# Patient Record
Sex: FEMALE | Race: BLACK OR AFRICAN AMERICAN
[De-identification: names, ages, dates, MRNs, and addresses within clinical notes are randomized per-mention and may not be internally consistent; named-entity substitution may affect disease eponyms.]

---

## 2019-10-28 ENCOUNTER — HOSPITAL ENCOUNTER (EMERGENCY)
Dept: HOSPITAL 61 - ER | Age: 84
Discharge: HOME | End: 2019-10-28
Payer: MEDICARE

## 2019-10-28 VITALS — BODY MASS INDEX: 30.36 KG/M2 | HEIGHT: 62 IN | WEIGHT: 165 LBS

## 2019-10-28 VITALS — SYSTOLIC BLOOD PRESSURE: 166 MMHG | DIASTOLIC BLOOD PRESSURE: 82 MMHG

## 2019-10-28 DIAGNOSIS — I10: ICD-10-CM

## 2019-10-28 DIAGNOSIS — K80.20: Primary | ICD-10-CM

## 2019-10-28 DIAGNOSIS — Z90.49: ICD-10-CM

## 2019-10-28 DIAGNOSIS — N30.00: ICD-10-CM

## 2019-10-28 DIAGNOSIS — Z85.038: ICD-10-CM

## 2019-10-28 LAB
ALBUMIN SERPL-MCNC: 2.9 G/DL (ref 3.4–5)
ALBUMIN/GLOB SERPL: 0.6 {RATIO} (ref 1–1.7)
ALP SERPL-CCNC: 81 U/L (ref 46–116)
ALT SERPL-CCNC: 16 U/L (ref 14–59)
ANION GAP SERPL CALC-SCNC: 10 MMOL/L (ref 6–14)
APTT BLD: 26 SEC (ref 24–38)
APTT PPP: YELLOW S
AST SERPL-CCNC: 18 U/L (ref 15–37)
BACTERIA #/AREA URNS HPF: (no result) /HPF
BASOPHILS # BLD AUTO: 0 X10^3/UL (ref 0–0.2)
BASOPHILS NFR BLD: 1 % (ref 0–3)
BILIRUB SERPL-MCNC: 0.3 MG/DL (ref 0.2–1)
BILIRUB UR QL STRIP: NEGATIVE
BUN SERPL-MCNC: 15 MG/DL (ref 7–20)
BUN/CREAT SERPL: 15 (ref 6–20)
CALCIUM SERPL-MCNC: 8.6 MG/DL (ref 8.5–10.1)
CHLORIDE SERPL-SCNC: 108 MMOL/L (ref 98–107)
CK SERPL-CCNC: 94 U/L (ref 26–192)
CO2 SERPL-SCNC: 23 MMOL/L (ref 21–32)
CREAT SERPL-MCNC: 1 MG/DL (ref 0.6–1)
EOSINOPHIL NFR BLD: 0.1 X10^3/UL (ref 0–0.7)
EOSINOPHIL NFR BLD: 3 % (ref 0–3)
ERYTHROCYTE [DISTWIDTH] IN BLOOD BY AUTOMATED COUNT: 14.1 % (ref 11.5–14.5)
FIBRINOGEN PPP-MCNC: (no result) MG/DL
GFR SERPLBLD BASED ON 1.73 SQ M-ARVRAT: 63.5 ML/MIN
GLOBULIN SER-MCNC: 4.6 G/DL (ref 2.2–3.8)
GLUCOSE SERPL-MCNC: 101 MG/DL (ref 70–99)
HCT VFR BLD CALC: 37.1 % (ref 36–47)
HGB BLD-MCNC: 12.4 G/DL (ref 12–15.5)
HYALINE CASTS #/AREA URNS LPF: (no result) /HPF
LIPASE: 117 U/L (ref 73–393)
LYMPHOCYTES # BLD: 0.6 X10^3/UL (ref 1–4.8)
LYMPHOCYTES NFR BLD AUTO: 12 % (ref 24–48)
MAGNESIUM SERPL-MCNC: 1.8 MG/DL (ref 1.8–2.4)
MCH RBC QN AUTO: 31 PG (ref 25–35)
MCHC RBC AUTO-ENTMCNC: 33 G/DL (ref 31–37)
MCV RBC AUTO: 92 FL (ref 79–100)
MONO #: 0.5 X10^3/UL (ref 0–1.1)
MONOCYTES NFR BLD: 11 % (ref 0–9)
NEUT #: 3.5 X10^3/UL (ref 1.8–7.7)
NEUTROPHILS NFR BLD AUTO: 73 % (ref 31–73)
NITRITE UR QL STRIP: NEGATIVE
PH UR STRIP: 5.5 [PH]
PLATELET # BLD AUTO: 219 X10^3/UL (ref 140–400)
POTASSIUM SERPL-SCNC: 3.7 MMOL/L (ref 3.5–5.1)
PROT SERPL-MCNC: 7.5 G/DL (ref 6.4–8.2)
PROT UR STRIP-MCNC: 100 MG/DL
PROTHROMBIN TIME: 13.1 SEC (ref 11.7–14)
RBC # BLD AUTO: 4.05 X10^6/UL (ref 3.5–5.4)
RBC #/AREA URNS HPF: (no result) /HPF (ref 0–2)
SODIUM SERPL-SCNC: 141 MMOL/L (ref 136–145)
SQUAMOUS #/AREA URNS LPF: (no result) /LPF
UROBILINOGEN UR-MCNC: 0.2 MG/DL
WBC # BLD AUTO: 4.8 X10^3/UL (ref 4–11)
WBC #/AREA URNS HPF: (no result) /HPF (ref 0–4)

## 2019-10-28 PROCEDURE — 87086 URINE CULTURE/COLONY COUNT: CPT

## 2019-10-28 PROCEDURE — 85025 COMPLETE CBC W/AUTO DIFF WBC: CPT

## 2019-10-28 PROCEDURE — 93005 ELECTROCARDIOGRAM TRACING: CPT

## 2019-10-28 PROCEDURE — 85730 THROMBOPLASTIN TIME PARTIAL: CPT

## 2019-10-28 PROCEDURE — 80053 COMPREHEN METABOLIC PANEL: CPT

## 2019-10-28 PROCEDURE — 82553 CREATINE MB FRACTION: CPT

## 2019-10-28 PROCEDURE — 74176 CT ABD & PELVIS W/O CONTRAST: CPT

## 2019-10-28 PROCEDURE — 85610 PROTHROMBIN TIME: CPT

## 2019-10-28 PROCEDURE — 96374 THER/PROPH/DIAG INJ IV PUSH: CPT

## 2019-10-28 PROCEDURE — 83735 ASSAY OF MAGNESIUM: CPT

## 2019-10-28 PROCEDURE — 83605 ASSAY OF LACTIC ACID: CPT

## 2019-10-28 PROCEDURE — 84484 ASSAY OF TROPONIN QUANT: CPT

## 2019-10-28 PROCEDURE — 99285 EMERGENCY DEPT VISIT HI MDM: CPT

## 2019-10-28 PROCEDURE — 81001 URINALYSIS AUTO W/SCOPE: CPT

## 2019-10-28 PROCEDURE — 83690 ASSAY OF LIPASE: CPT

## 2019-10-28 PROCEDURE — 36415 COLL VENOUS BLD VENIPUNCTURE: CPT

## 2019-10-28 PROCEDURE — 96375 TX/PRO/DX INJ NEW DRUG ADDON: CPT

## 2019-10-28 NOTE — EKG
[facility]

              [address] [place] [address]

Test Date:    2019-10-28               Test Time:    00:56:41

Pat Name:     BETTYE HUBER              Department:   

Patient ID:   [id number]           Room:          

Gender:       F                        Technician:   

:          1932               Requested By: ZUHAIR LOU

Order Number: [id number]           Reading MD:   Hugh Bentley MD

                                 Measurements

Intervals                              Axis          

Rate:         66                       P:            

MA:                                    QRS:          -43

QRSD:         118                      T:            19

QT:           450                                    

QTc:          474                                    

                           Interpretive Statements

SR

RBBB

NON-SPECIFIC ST/T CHANGES

Electronically Signed On 2019 9:24:41 CST by Hugh Bentley MD

## 2019-10-28 NOTE — PHYS DOC
Past Medical History


Past Medical History:  Cancer, Hypertension


Additional Past Medical Histor:  COLON CA


Past Surgical History:  Colectomy


Additional Past Surgical Histo:  INTESTIONAL MESH


Additional Information:  


Nonsmoker


Alcohol Use:  None


Drug Use:  None





Adult General


Chief Complaint


Chief Complaint:  ABDOMINAL PAIN





HPI


HPI





86 y/o female presents with history of intermittent epigastric pain and nausea x

2 days.  Reports has had similar episodes in the past which typically "go away".

Denies fever/chills.  Denies trauma.





Review of Systems


Review of Systems





Constitutional: Denies fever or chills 


Eyes: Denies redness or eye pain 


HENT: Denies nasal congestion or sore throat


Respiratory: Denies cough or shortness of breath 


Cardiovascular: Denies chest pain or palpitations


GI: Reports abdominal pain and nausea; denies vomiting


: Denies dysuria or hematuria


Musculoskeletal: Denies back pain or joint pain


Integument: Denies rash or skin lesions 


Neurologic: Denies headache, focal weakness or sensory changes





Complete systems were reviewed and found to be within normal limits, except as 

documented in this note.





Current Medications


Current Medications





Current Medications








 Medications


  (Trade)  Dose


 Ordered  Sig/Cecilio  Start Time


 Stop Time Status Last Admin


Dose Admin


 


 Ceftriaxone Sodium


  (Rocephin)  1 gm  1X  ONCE  10/28/19 03:00


 10/28/19 03:01 DC 10/28/19 02:43


1 GM


 


 Famotidine


  (Pepcid Vial)  20 mg  1X  ONCE  10/28/19 01:00


 10/28/19 01:01 DC 10/28/19 00:48


20 MG


 


 Ondansetron HCl


  (Zofran)  4 mg  1X  ONCE  10/28/19 01:00


 10/28/19 01:01 DC 10/28/19 00:48


4 MG


 


 Sodium Chloride  1,000 ml @ 


 1,000 mls/hr  1X  ONCE  10/28/19 01:00


 10/28/19 01:59 DC 10/28/19 00:48


1,000 MLS/HR











Allergies


Allergies





Allergies








Coded Allergies Type Severity Reaction Last Updated Verified


 


  iodine Allergy Intermediate Itching 12/16/13 Yes











Physical Exam


Physical Exam





Constitutional: Well developed, well nourished, no acute distress, non-toxic 

appearance


HENT: Normocephalic, atraumatic, oropharynx moist


Eyes: Conjunctiva normal, no discharge


Neck: Normal range of motion, no tenderness, supple


Cardiovascular: Heart rate normal, regular rhythm


Lungs & Thorax:  Bilateral breath sounds clear to auscultation, no wheezing


Abdomen: Soft, mild epigastric tenderness


Skin: Warm, dry, no erythema, no rash


Back: No tenderness, no CVA tenderness


Extremities: No tenderness, ROM intact, no edema


Neurologic: Alert and oriented X 3, normal motor function, normal sensory 

function, no focal deficits noted


Psychologic: Affect normal, judgement normal, mood normal





Current Patient Data


Vital Signs





                                   Vital Signs








  Date Time  Temp Pulse Resp B/P (MAP) Pulse Ox O2 Delivery O2 Flow Rate FiO2


 


10/28/19 02:44  68 16 166/82 (110) 95 Room Air  


 


10/28/19 00:14 97.7       





 97.7       








Lab Values





                                Laboratory Tests








Test


 10/28/19


00:05 10/28/19


00:17 10/28/19


01:35


 


White Blood Count


 4.8 x10^3/uL


(4.0-11.0) 


 





 


Red Blood Count


 4.05 x10^6/uL


(3.50-5.40) 


 





 


Hemoglobin


 12.4 g/dL


(12.0-15.5) 


 





 


Hematocrit


 37.1 %


(36.0-47.0) 


 





 


Mean Corpuscular Volume


 92 fL ()


 


 





 


Mean Corpuscular Hemoglobin 31 pg (25-35)    


 


Mean Corpuscular Hemoglobin


Concent 33 g/dL


(31-37) 


 





 


Red Cell Distribution Width


 14.1 %


(11.5-14.5) 


 





 


Platelet Count


 219 x10^3/uL


(140-400) 


 





 


Neutrophils (%) (Auto) 73 % (31-73)    


 


Lymphocytes (%) (Auto) 12 % (24-48)  L  


 


Monocytes (%) (Auto) 11 % (0-9)  H  


 


Eosinophils (%) (Auto) 3 % (0-3)    


 


Basophils (%) (Auto) 1 % (0-3)    


 


Neutrophils # (Auto)


 3.5 x10^3/uL


(1.8-7.7) 


 





 


Lymphocytes # (Auto)


 0.6 x10^3/uL


(1.0-4.8)  L 


 





 


Monocytes # (Auto)


 0.5 x10^3/uL


(0.0-1.1) 


 





 


Eosinophils # (Auto)


 0.1 x10^3/uL


(0.0-0.7) 


 





 


Basophils # (Auto)


 0.0 x10^3/uL


(0.0-0.2) 


 





 


Prothrombin Time


 13.1 SEC


(11.7-14.0) 


 





 


Prothrombin Time INR 1.0 (0.8-1.1)    


 


Activated Partial


Thromboplast Time 26 SEC (24-38)


 


 





 


Lactic Acid Level


 1.2 mmol/L


(0.4-2.0) 


 





 


Urine Collection Type  Unknown   


 


Urine Color  Yellow   


 


Urine Clarity  Cloudy   


 


Urine pH  5.5   


 


Urine Specific Gravity  1.020   


 


Urine Protein


 


 100 mg/dL


(NEG-TRACE) 





 


Urine Glucose (UA)


 


 Negative mg/dL


(NEG) 





 


Urine Ketones (Stick)


 


 Negative mg/dL


(NEG) 





 


Urine Blood  Large (NEG)   


 


Urine Nitrite


 


 Negative (NEG)


 





 


Urine Bilirubin


 


 Negative (NEG)


 





 


Urine Urobilinogen Dipstick


 


 0.2 mg/dL (0.2


mg/dL) 





 


Urine Leukocyte Esterase  Large (NEG)   


 


Urine RBC


 


 Occ /HPF (0-2)


 





 


Urine WBC


 


 Tntc /HPF


(0-4) 





 


Urine Squamous Epithelial


Cells 


 Many /LPF  


 





 


Urine Bacteria


 


 Many /HPF


(0-FEW) 





 


Urine Hyaline Casts  Few /HPF   


 


Urine Mucus  Mod /LPF   


 


Sodium Level


 


 


 141 mmol/L


(136-145)


 


Potassium Level


 


 


 3.7 mmol/L


(3.5-5.1)


 


Chloride Level


 


 


 108 mmol/L


()  H


 


Carbon Dioxide Level


 


 


 23 mmol/L


(21-32)


 


Anion Gap   10 (6-14)  


 


Blood Urea Nitrogen


 


 


 15 mg/dL


(7-20)


 


Creatinine


 


 


 1.0 mg/dL


(0.6-1.0)


 


Estimated GFR


(Cockcroft-Gault) 


 


 63.5  





 


BUN/Creatinine Ratio   15 (6-20)  


 


Glucose Level


 


 


 101 mg/dL


(70-99)  H


 


Calcium Level


 


 


 8.6 mg/dL


(8.5-10.1)


 


Magnesium Level


 


 


 1.8 mg/dL


(1.8-2.4)


 


Total Bilirubin


 


 


 0.3 mg/dL


(0.2-1.0)


 


Aspartate Amino Transferase


(AST) 


 


 18 U/L (15-37)





 


Alanine Aminotransferase (ALT)


 


 


 16 U/L (14-59)





 


Alkaline Phosphatase


 


 


 81 U/L


()


 


Creatine Kinase


 


 


 94 U/L


()


 


Creatine Kinase MB (Mass)


 


 


 1.0 ng/mL


(0.0-3.6)


 


Creatine Kinase MB Relative


Index 


 


 1.1 % (0-4)  





 


Troponin I Quantitative


 


 


 < 0.017 ng/mL


(0.000-0.055)


 


Total Protein


 


 


 7.5 g/dL


(6.4-8.2)


 


Albumin


 


 


 2.9 g/dL


(3.4-5.0)  L


 


Albumin/Globulin Ratio


 


 


 0.6 (1.0-1.7)


L


 


Lipase


 


 


 117 U/L


()





                                Laboratory Tests


10/28/19 00:05








                                Laboratory Tests


10/28/19 01:35














EKG


EKG


@0056 NSR at 66bpm with arrhythmia, NO ST elevation, baseline artifact





Radiology/Procedures


Radiology/Procedures


PROCEDURE: CT ABDOMEN PELVIS WO CONTRAST





 


Abdominal and Pelvis CT, Without Contrast:


 


History: Epigastric pain nausea and vomiting


 


Comparison: None.


 


Procedure: Axial images are obtained of the abdomen and pelvis, without IV


or oral contrast.


 


Oral Contrast:  No


 


Findings:


 


Evaluation of solid organs is limited without contrast. Evaluation of 


stomach and bowel is limited without oral contrast.


 


There are stones the gallbladder without wall thickening or surrounding 


inflammation.


 


There is an ileostomy on the right


 


 


 


Liver: Normal.


 


Spleen: Normal.


 


Pancreas: Normal.


 


 


Adrenal Glands: Normal.


 


Kidneys: Normal.


 


There is no free air or free fluid.


 


There is no lymphadenopathy.


 


 


The urinary bladder is collapsed.


 


 


There is no pericolonic inflammation identified.


 


Impression:


 


Cholelithiasis without evidence of acute cholecystitis.


 


No acute findings.


 


End impression


 


PQRS Compliance Statement:


 


One or more of the following individualized dose reduction techniques were


utilized for this examination:  


1. Automated exposure control  


2. Adjustment of the mA and/or kV according to patient size  


3. Use of iterative reconstruction technique


 


Electronically signed by: Mehran Wood III, MD (10/28/2019 1:28 AM) 


[facility]





Course & Med Decision Making


Course & Med Decision Making


Pertinent Labs and Imaging studies reviewed. (See chart for details)





Patient presents with report of epigastric pain which has been worse over the 

last 2 days but of which patient has had similar episodes over the last 2 years.

 Symptomatic treatment provided.  Labs obtained and posted to chart. UA with 

signs of infection.  Empiric antibiotics given.  EKG stable.  CT abd/pelvis with

cholelithiasis noted.  Patient stable for discharge with outpatient follow-up 

with PCP/GI.  GI referral provided. Discussed findings and plan with patient and

family, who acknowledge understanding and agreement.





Dragon Disclaimer


Dragon Disclaimer


This electronic medical record was generated, in whole or in part, using a voice

recognition dictation system.





Departure


Departure


Impression:  


   Primary Impression:  


   Epigastric abdominal pain


   Additional Impressions:  


   Cholelithiasis


   UTI (urinary tract infection)


Disposition:  01 HOME, SELF-CARE


Condition:  STABLE


Referrals:  


CAYETANO ARVIZU (PCP)








CLARISSA AGUDELO MD


Patient Instructions:  Abdominal Pain, Easy-to-Read, Cholelithiasis, Ea

sy-to-Read, Urinary Tract Infection, Easy-to-Read


Scripts


Ondansetron (ONDANSETRON ODT) 4 Mg Tab.rapdis


1 TAB PO PRN Q6-8HRS PRN for NAUSEA, #16 TAB


   Prov: ZUHAIR LOU DO         10/28/19 


Cephalexin (KEFLEX) 500 Mg Capsule


500 MG PO TID for 7 Days, #21 CAP


   Prov: ZUHAIR LOU DO         10/28/19





Problem Qualifiers








   Additional Impressions:  


   Cholelithiasis


   Cholelithiasis location:  gallbladder  Cholecystitis presence:  without 

   cholecystitis  Biliary obstruction:  without biliary obstruction  Qualified 

   Codes:  K80.20 - Calculus of gallbladder without cholecystitis without 

   obstruction


   UTI (urinary tract infection)


   Urinary tract infection type:  acute cystitis  Hematuria presence:  without 

   hematuria  Qualified Codes:  N30.00 - Acute cystitis without hematuria








ZUHAIR LOU DO             Oct 28, 2019 00:56

## 2019-10-28 NOTE — RAD
Abdominal and Pelvis CT, Without Contrast:

 

History: Epigastric pain nausea and vomiting

 

Comparison: None.

 

Procedure: Axial images are obtained of the abdomen and pelvis, without IV

or oral contrast.

 

Oral Contrast:  No

 

Findings:

 

Evaluation of solid organs is limited without contrast. Evaluation of 

stomach and bowel is limited without oral contrast.

 

There are stones the gallbladder without wall thickening or surrounding 

inflammation.

 

There is an ileostomy on the right

 

 

 

Liver: Normal.

 

Spleen: Normal.

 

Pancreas: Normal.

 

 

Adrenal Glands: Normal.

 

Kidneys: Normal.

 

There is no free air or free fluid.

 

There is no lymphadenopathy.

 

 

The urinary bladder is collapsed.

 

 

There is no pericolonic inflammation identified.

 

Impression:

 

Cholelithiasis without evidence of acute cholecystitis.

 

No acute findings.

 

End impression

 

PQRS Compliance Statement:

 

One or more of the following individualized dose reduction techniques were

utilized for this examination:  

1. Automated exposure control  

2. Adjustment of the mA and/or kV according to patient size  

3. Use of iterative reconstruction technique

 

Electronically signed by: Mehran Wood III, MD (10/28/2019 1:28 AM) 

[facility]

## 2020-02-11 ENCOUNTER — HOSPITAL ENCOUNTER (EMERGENCY)
Dept: HOSPITAL 61 - ER | Age: 85
Discharge: HOME | End: 2020-02-11
Payer: MEDICARE

## 2020-02-11 VITALS — SYSTOLIC BLOOD PRESSURE: 132 MMHG | DIASTOLIC BLOOD PRESSURE: 68 MMHG

## 2020-02-11 VITALS — HEIGHT: 62 IN | WEIGHT: 165.35 LBS | BODY MASS INDEX: 30.43 KG/M2

## 2020-02-11 DIAGNOSIS — I10: Primary | ICD-10-CM

## 2020-02-11 DIAGNOSIS — Z88.8: ICD-10-CM

## 2020-02-11 DIAGNOSIS — Z93.3: ICD-10-CM

## 2020-02-11 DIAGNOSIS — Z85.038: ICD-10-CM

## 2020-02-11 LAB
ALBUMIN SERPL-MCNC: 3.4 G/DL (ref 3.4–5)
ALBUMIN/GLOB SERPL: 0.8 {RATIO} (ref 1–1.7)
ALP SERPL-CCNC: 93 U/L (ref 46–116)
ALT SERPL-CCNC: 19 U/L (ref 14–59)
ANION GAP SERPL CALC-SCNC: 7 MMOL/L (ref 6–14)
AST SERPL-CCNC: 19 U/L (ref 15–37)
BASOPHILS # BLD AUTO: 0 X10^3/UL (ref 0–0.2)
BASOPHILS NFR BLD: 1 % (ref 0–3)
BILIRUB SERPL-MCNC: 0.3 MG/DL (ref 0.2–1)
BUN SERPL-MCNC: 16 MG/DL (ref 7–20)
BUN/CREAT SERPL: 13 (ref 6–20)
CALCIUM SERPL-MCNC: 9.2 MG/DL (ref 8.5–10.1)
CHLORIDE SERPL-SCNC: 106 MMOL/L (ref 98–107)
CO2 SERPL-SCNC: 28 MMOL/L (ref 21–32)
CREAT SERPL-MCNC: 1.2 MG/DL (ref 0.6–1)
EOSINOPHIL NFR BLD: 0.1 X10^3/UL (ref 0–0.7)
EOSINOPHIL NFR BLD: 2 % (ref 0–3)
ERYTHROCYTE [DISTWIDTH] IN BLOOD BY AUTOMATED COUNT: 14.1 % (ref 11.5–14.5)
GFR SERPLBLD BASED ON 1.73 SQ M-ARVRAT: 51.3 ML/MIN
GLOBULIN SER-MCNC: 4.4 G/DL (ref 2.2–3.8)
GLUCOSE SERPL-MCNC: 114 MG/DL (ref 70–99)
HCT VFR BLD CALC: 37.6 % (ref 36–47)
HGB BLD-MCNC: 12.1 G/DL (ref 12–15.5)
LYMPHOCYTES # BLD: 0.6 X10^3/UL (ref 1–4.8)
LYMPHOCYTES NFR BLD AUTO: 12 % (ref 24–48)
MCH RBC QN AUTO: 31 PG (ref 25–35)
MCHC RBC AUTO-ENTMCNC: 32 G/DL (ref 31–37)
MCV RBC AUTO: 95 FL (ref 79–100)
MONO #: 0.6 X10^3/UL (ref 0–1.1)
MONOCYTES NFR BLD: 12 % (ref 0–9)
NEUT #: 3.5 X10^3/UL (ref 1.8–7.7)
NEUTROPHILS NFR BLD AUTO: 74 % (ref 31–73)
PLATELET # BLD AUTO: 215 X10^3/UL (ref 140–400)
POTASSIUM SERPL-SCNC: 3.4 MMOL/L (ref 3.5–5.1)
PROT SERPL-MCNC: 7.8 G/DL (ref 6.4–8.2)
RBC # BLD AUTO: 3.96 X10^6/UL (ref 3.5–5.4)
SODIUM SERPL-SCNC: 141 MMOL/L (ref 136–145)
WBC # BLD AUTO: 4.8 X10^3/UL (ref 4–11)

## 2020-02-11 PROCEDURE — 93005 ELECTROCARDIOGRAM TRACING: CPT

## 2020-02-11 PROCEDURE — 85025 COMPLETE CBC W/AUTO DIFF WBC: CPT

## 2020-02-11 PROCEDURE — 83880 ASSAY OF NATRIURETIC PEPTIDE: CPT

## 2020-02-11 PROCEDURE — 84484 ASSAY OF TROPONIN QUANT: CPT

## 2020-02-11 PROCEDURE — 71045 X-RAY EXAM CHEST 1 VIEW: CPT

## 2020-02-11 PROCEDURE — 70450 CT HEAD/BRAIN W/O DYE: CPT

## 2020-02-11 PROCEDURE — 36415 COLL VENOUS BLD VENIPUNCTURE: CPT

## 2020-02-11 PROCEDURE — 80053 COMPREHEN METABOLIC PANEL: CPT

## 2020-02-11 NOTE — RAD
Exam: CT head

 

INDICATION: Headache

 

TECHNIQUE: Sequential axial images through the head were obtained without 

the administration of IV contrast.

 

Comparisons: None

 

FINDINGS:

No focal parenchymal lesion or hemorrhage is identified. There is no 

midline shift or sulcal effacement.

 

No acute vascular territory infarction is identified. Gray-white 

distinction is preserved.

 

The ventricular system is within normal limits without compression 

hydrocephalus. The basal cisterns are well maintained.

 

The visualized portions of the paranasal sinuses and mastoid air cells are

well-pneumatized. No acute fractures.

 

IMPRESSION:

No acute intracranial abnormality.

 

Exposure: One or more of the following in the visualized dose reduction 

techniques were utilized for this examination:

1.  Automated exposure control

2.  Adjustment of the MA and/or KV according to patient size

Use of iterative of reconstructive technique

 

Electronically signed by: Jose Elias Lam MD (2/11/2020 10:01 PM) [id number]

## 2020-02-11 NOTE — PHYS DOC
Past Medical History


Past Medical History:  Hypertension


Additional Past Medical Histor:  COLON CA


Past Surgical History:  Cancer Surgery


Additional Past Surgical Histo:  INTESTIONAL MESH, COLOSTOMY,


Smoking Status:  Never Smoker


Alcohol Use:  None


Drug Use:  None





Adult General


Chief Complaint


Chief Complaint:  HYPERTENSION





HPI


HPI





Patient is a 88  year old Female who presents with a history of high blood 

pressure and states that recently her blood pressures been running higher than 

usual. She states that she takes her lisinopril 2.5 mg but her blood pressure 

began running high. She states she went to [place] to have teeth pulled they 

would not pull her teeth because her blood pressure was high. She states that 

her doctor told her to take a second lisinopril. She took that at 1600 today. 

Patient states that times her left cheek will "feel funny". She also states that

she does have a headache right at the top of her head that states that she is 

very mild and rates her pain an 8 out of 10. Patient denies chest pain, nausea, 

vomiting, dizziness, visual changes, numbness or tingling, shortness of breath, 

abdominal pain, weakness, fever, dysuria.





Review of Systems


Review of Systems








Neurologic:  headache, denies focal weakness or sensory changes []








All other systems were reviewed and found to be within normal limits, except as 

documented in this note.





Allergies


Allergies





Allergies








Coded Allergies Type Severity Reaction Last Updated Verified


 


  iodine Allergy Intermediate Itching 13 Yes











Physical Exam


Physical Exam





Constitutional: Well developed, well nourished, no acute distress, non-toxic 

appearance. []


HENT: Normocephalic, atraumatic, bilateral external ears normal, oropharynx 

moist, no oral exudates, nose normal. []


Eyes: PERRLA, EOMI, conjunctiva normal, no discharge. [] 


Neck: Normal range of motion, no tenderness, supple, no stridor. [] 


Cardiovascular:Heart rate regular rhythm, no murmur []


Lungs & Thorax:  Bilateral breath sounds clear to auscultation []


Abdomen: Bowel sounds normal, soft, no tenderness, no masses, no pulsatile 

masses. [] 


Skin: Warm, dry, no erythema, no rash. [] 


Back: No tenderness, no CVA tenderness. [] 


Extremities: No tenderness, no cyanosis, no clubbing, ROM intact, no edema. [] 


Neurologic: Alert and oriented X 3, normal motor function, normal sensory 

function, no focal deficits noted. []


Psychologic: Affect normal, judgement normal, mood normal. 





**Normal Physical Exam[]





Current Patient Data


Vital Signs





                                   Vital Signs








  Date Time  Temp Pulse Resp B/P (MAP) Pulse Ox O2 Delivery O2 Flow Rate FiO2


 


20 19:43 97.9 67 20 187/95 (125)  Room Air  





 97.9       


 


20 18:36       96.0 








Lab Values





                                Laboratory Tests








Test


 20


21:22


 


White Blood Count


 4.8 x10^3/uL


(4.0-11.0)


 


Red Blood Count


 3.96 x10^6/uL


(3.50-5.40)


 


Hemoglobin


 12.1 g/dL


(12.0-15.5)


 


Hematocrit


 37.6 %


(36.0-47.0)


 


Mean Corpuscular Volume


 95 fL ()





 


Mean Corpuscular Hemoglobin 31 pg (25-35)  


 


Mean Corpuscular Hemoglobin


Concent 32 g/dL


(31-37)


 


Red Cell Distribution Width


 14.1 %


(11.5-14.5)


 


Platelet Count


 215 x10^3/uL


(140-400)


 


Neutrophils (%) (Auto) 74 % (31-73)  H


 


Lymphocytes (%) (Auto) 12 % (24-48)  L


 


Monocytes (%) (Auto) 12 % (0-9)  H


 


Eosinophils (%) (Auto) 2 % (0-3)  


 


Basophils (%) (Auto) 1 % (0-3)  


 


Neutrophils # (Auto)


 3.5 x10^3/uL


(1.8-7.7)


 


Lymphocytes # (Auto)


 0.6 x10^3/uL


(1.0-4.8)  L


 


Monocytes # (Auto)


 0.6 x10^3/uL


(0.0-1.1)


 


Eosinophils # (Auto)


 0.1 x10^3/uL


(0.0-0.7)


 


Basophils # (Auto)


 0.0 x10^3/uL


(0.0-0.2)


 


Sodium Level


 141 mmol/L


(136-145)


 


Potassium Level


 3.4 mmol/L


(3.5-5.1)  L


 


Chloride Level


 106 mmol/L


()


 


Carbon Dioxide Level


 28 mmol/L


(21-32)


 


Anion Gap 7 (6-14)  


 


Blood Urea Nitrogen


 16 mg/dL


(7-20)


 


Creatinine


 1.2 mg/dL


(0.6-1.0)  H


 


Estimated GFR


(Cockcroft-Gault) 51.3  





 


BUN/Creatinine Ratio 13 (6-20)  


 


Glucose Level


 114 mg/dL


(70-99)  H


 


Calcium Level


 9.2 mg/dL


(8.5-10.1)


 


Total Bilirubin


 0.3 mg/dL


(0.2-1.0)


 


Aspartate Amino Transferase


(AST) 19 U/L (15-37)





 


Alanine Aminotransferase (ALT)


 19 U/L (14-59)





 


Alkaline Phosphatase


 93 U/L


()


 


Troponin I Quantitative


 < 0.017 ng/mL


(0.000-0.055)


 


NT-Pro-B-Type Natriuretic


Peptide 596 pg/mL


(0-449)  H


 


Total Protein


 7.8 g/dL


(6.4-8.2)


 


Albumin


 3.4 g/dL


(3.4-5.0)


 


Albumin/Globulin Ratio


 0.8 (1.0-1.7)


L





                                Laboratory Tests


20 21:22








                                Laboratory Tests


20 21:22











EKG


EKG


Sinus rhythm and no STEMI[]


Interpretation Time:


 and read by Dr. Mcbride





Radiology/Procedures


Radiology/Procedures


[]


Impressions:


[address] [address] [phone]


                                        


                                 IMAGING REPORT





                                     Signed





PATIENT: BETTYE HUBER    ACCOUNT: [id number]     MRN#: [id number]


: 1932           LOCATION: ER              AGE: 88


SEX: F                    EXAM DT: 20         ACCESSION#: [id number]


STATUS: REG ER            ORD. PHYSICIAN: DAVIS MADRID


REASON: hypertension


PROCEDURE: PORTABLE CHEST 1V





AP chest.


 


HISTORY: Hypertension


 


AP portable view was taken of the chest. Heart is normal in size. The 


aorta is tortuous. There are no confluent infiltrates. 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 


in both shoulders.


 


IMPRESSION:


1. No acute chest disease.


 


Electronically signed by: Frank Jacob MD (2020 8:40 PM) [id number]














DICTATED and SIGNED BY:     FRANK JACOB MD


DATE:     20





                            [address] [place] [address]


                                 [phone]


                                        


                                 IMAGING REPORT





                                     Signed





PATIENT: BETTYE HUBER    ACCOUNT: [id number]     MRN#: [id number]


: 1932           LOCATION: ER              AGE: 88


SEX: F                    EXAM DT: 20         ACCESSION#: [id number]


STATUS: REG ER            ORD. PHYSICIAN: DAVIS MADRID


REASON: HEADACHE. hx colon ca


PROCEDURE: CT HEAD WO CONTRAST





Exam: CT head


 


INDICATION: Headache


 


TECHNIQUE: Sequential axial images through the head were obtained without 


the administration of IV contrast.


 


Comparisons: None


 


FINDINGS:


No focal parenchymal lesion or hemorrhage is identified. There is no 


midline shift or sulcal effacement.


 


No acute vascular territory infarction is identified. Gray-white 


distinction is preserved.


 


The ventricular system is within normal limits without compression 


hydrocephalus. The basal cisterns are well maintained.


 


The visualized portions of the paranasal sinuses and mastoid air cells are


well-pneumatized. No acute fractures.


 


IMPRESSION:


No acute intracranial abnormality.


 


Exposure: One or more of the following in the visualized dose reduction 


techniques were utilized for this examination:


1.  Automated exposure control


2.  Adjustment of the MA and/or KV according to patient size


Use of iterative of reconstructive technique


 


Electronically signed by: Jose Elias Olivas MD (2020 10:01 PM) [id number]














DICTATED and SIGNED BY:     JOSE ELIAS OLIVAS MD


DATE:     20








Course & Med Decision Making


Course & Med Decision Making


Pertinent Labs and Imaging studies reviewed. (See chart for details)





PERRLA. Alert and oriented. Speaks in full clear sentences. Ambulatory with a 

steady gait. Skin pink warm and dry. Patient's blood pressures are in the 180s 

over 80s. No extremity swelling. Lungs are clear to auscultation all lobes. 

Chest x-ray shows no acute findings. EKG shows sinus rhythm and no STEMI.





: Patient's blood pressure has come down to 135/86 without medication. 

Patient states that her headache is completely gone and she feels fine.





Patient discharged home and to call her doctor in the morning for follow-up and 

changes in her blood pressure medications.





[]





Dragon Disclaimer


Dragon Disclaimer


This electronic medical record was generated, in whole or in part, using a voice

 recognition dictation system.





NIHSS Stroke Scale


NIH Stroke Scale:  








NIH Stroke Scale Response (Comments) Value


 


Level of Consciousness:                 0 Alert/Responsive 0


 


LOC Questions:                          0 Answers both correctly 0


 


LOC Commands:                           0 Performs both tasks 0


 


Best Gaze:                              0 Normal 0


 


Visual:                                 0 No visual loss 0


 


Facial Palsy:                           0 Normal, symmetrical 0


 


Motor - Left Arm                        0 No drift 0


 


Motor - Right Arm                       0 No drift 0


 


Motor - Left Leg                        0 No drift 0


 


Motor: Right Leg                        0 No drift 0


 


Limb Ataxia:                            0 Absent 0


 


Sensory:                                0 No loss 0


 


Best Language:                          0 Normal 0


 


Dysathria:                              0 Normal 0


 


Extinction and Inattention:             0 Normal 0


 


Total  0











Departure


Departure


Impression:  


   Primary Impression:  


   Hypertension


Disposition:  01 HOME, SELF-CARE


Condition:  STABLE


Referrals:  


IMER FALCON D.O. (PCP)


Patient Instructions:  Hypertension





Additional Instructions:  


Call your doctor in the morning for a follow-up appointment.





Problem Qualifiers








   Primary Impression:  


   Hypertension


   Hypertension type:  essential hypertension  Qualified Codes:  I10 - Essential

    (primary) hypertension








DAVIS MADRID            2020 21:31

## 2020-02-11 NOTE — RAD
AP chest.

 

HISTORY: Hypertension

 

AP portable view was taken of the chest. Heart is normal in size. The 

aorta is tortuous. There are no confluent infiltrates. 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 

in both shoulders.

 

IMPRESSION:

1. No acute chest disease.

 

Electronically signed by: Frank Jacob MD (2/11/2020 8:40 PM) [id number]

## 2020-02-12 NOTE — EKG
[facility]

              [address] [place] [address]

Test Date:    2020               Test Time:    20:57:59

Pat Name:     BETTYE HUBER              Department:   

Patient ID:   [id number]           Room:          

Gender:       F                        Technician:   

:          [id number]               Requested By: DAVIS MADRID

Order Number: [id number]           Reading MD:     

                                 Measurements

Intervals                              Axis          

Rate:         67                       P:            1

IN:           158                      QRS:          -62

QRSD:         120                      T:            2

QT:           424                                    

QTc:          451                                    

                           Interpretive Statements

SINUS RHYTHM

ABNORMAL LEFT AXIS DEVIATION

R-S TRANSITION ZONE IN V LEADS DISPLACED TO THE RIGHT

ABNORMAL ECG

RI6.01

No previous ECG available for comparison

## 2020-05-16 ENCOUNTER — HOSPITAL ENCOUNTER (EMERGENCY)
Dept: HOSPITAL 61 - ER | Age: 85
Discharge: HOME | End: 2020-05-16
Payer: MEDICARE

## 2020-05-16 VITALS
SYSTOLIC BLOOD PRESSURE: 157 MMHG | DIASTOLIC BLOOD PRESSURE: 93 MMHG | DIASTOLIC BLOOD PRESSURE: 93 MMHG | SYSTOLIC BLOOD PRESSURE: 157 MMHG

## 2020-05-16 VITALS — BODY MASS INDEX: 34.19 KG/M2 | HEIGHT: 60 IN | WEIGHT: 174.17 LBS

## 2020-05-16 DIAGNOSIS — Z88.8: ICD-10-CM

## 2020-05-16 DIAGNOSIS — I10: ICD-10-CM

## 2020-05-16 DIAGNOSIS — F32.9: ICD-10-CM

## 2020-05-16 DIAGNOSIS — E86.0: ICD-10-CM

## 2020-05-16 DIAGNOSIS — G24.9: Primary | ICD-10-CM

## 2020-05-16 DIAGNOSIS — F41.9: ICD-10-CM

## 2020-05-16 DIAGNOSIS — G24.9: ICD-10-CM

## 2020-05-16 DIAGNOSIS — R41.82: Primary | ICD-10-CM

## 2020-05-16 LAB
% BANDS: 1 % (ref 0–9)
% LYMPHS: 4 % (ref 24–48)
% MONOS: 1 % (ref 0–10)
% SEGS: 94 % (ref 35–66)
ALBUMIN SERPL-MCNC: 2.8 G/DL (ref 3.4–5)
ALBUMIN/GLOB SERPL: 0.7 {RATIO} (ref 1–1.7)
ALP SERPL-CCNC: 82 U/L (ref 46–116)
ALT SERPL-CCNC: 30 U/L (ref 14–59)
ANION GAP SERPL CALC-SCNC: 12 MMOL/L (ref 6–14)
APTT PPP: YELLOW S
AST SERPL-CCNC: 31 U/L (ref 15–37)
BACTERIA #/AREA URNS HPF: 0 /HPF
BASOPHILS # BLD AUTO: 0 X10^3/UL (ref 0–0.2)
BASOPHILS NFR BLD: 0 % (ref 0–3)
BILIRUB SERPL-MCNC: 0.3 MG/DL (ref 0.2–1)
BILIRUB UR QL STRIP: NEGATIVE
BUN SERPL-MCNC: 22 MG/DL (ref 7–20)
BUN/CREAT SERPL: 22 (ref 6–20)
CALCIUM SERPL-MCNC: 8.5 MG/DL (ref 8.5–10.1)
CHLORIDE SERPL-SCNC: 111 MMOL/L (ref 98–107)
CK SERPL-CCNC: 105 U/L (ref 26–192)
CO2 SERPL-SCNC: 23 MMOL/L (ref 21–32)
CREAT SERPL-MCNC: 1 MG/DL (ref 0.6–1)
EOSINOPHIL NFR BLD: 0 % (ref 0–3)
EOSINOPHIL NFR BLD: 0 X10^3/UL (ref 0–0.7)
ERYTHROCYTE [DISTWIDTH] IN BLOOD BY AUTOMATED COUNT: 14.4 % (ref 11.5–14.5)
FIBRINOGEN PPP-MCNC: CLEAR MG/DL
GFR SERPLBLD BASED ON 1.73 SQ M-ARVRAT: 63.3 ML/MIN
GLOBULIN SER-MCNC: 4.3 G/DL (ref 2.2–3.8)
GLUCOSE SERPL-MCNC: 104 MG/DL (ref 70–99)
HCT VFR BLD CALC: 39.8 % (ref 36–47)
HGB BLD-MCNC: 13.1 G/DL (ref 12–15.5)
HYALINE CASTS #/AREA URNS LPF: (no result) /HPF
LYMPHOCYTES # BLD: 0.4 X10^3/UL (ref 1–4.8)
LYMPHOCYTES NFR BLD AUTO: 4 % (ref 24–48)
MAGNESIUM SERPL-MCNC: 1.8 MG/DL (ref 1.8–2.4)
MCH RBC QN AUTO: 30 PG (ref 25–35)
MCHC RBC AUTO-ENTMCNC: 33 G/DL (ref 31–37)
MCV RBC AUTO: 92 FL (ref 79–100)
MONO #: 0.6 X10^3/UL (ref 0–1.1)
MONOCYTES NFR BLD: 6 % (ref 0–9)
NEUT #: 9.8 X10^3/UL (ref 1.8–7.7)
NEUTROPHILS NFR BLD AUTO: 90 % (ref 31–73)
NITRITE UR QL STRIP: NEGATIVE
PH UR STRIP: 6 [PH]
PLATELET # BLD AUTO: 161 X10^3/UL (ref 140–400)
PLATELET # BLD EST: ADEQUATE 10*3/UL
POTASSIUM SERPL-SCNC: 3.1 MMOL/L (ref 3.5–5.1)
PROT SERPL-MCNC: 7.1 G/DL (ref 6.4–8.2)
PROT UR STRIP-MCNC: 30 MG/DL
RBC # BLD AUTO: 4.31 X10^6/UL (ref 3.5–5.4)
RBC #/AREA URNS HPF: (no result) /HPF (ref 0–2)
SODIUM SERPL-SCNC: 146 MMOL/L (ref 136–145)
SQUAMOUS #/AREA URNS LPF: (no result) /LPF
UROBILINOGEN UR-MCNC: 0.2 MG/DL
WBC # BLD AUTO: 10.9 X10^3/UL (ref 4–11)
WBC #/AREA URNS HPF: 0 /HPF (ref 0–4)

## 2020-05-16 PROCEDURE — 84484 ASSAY OF TROPONIN QUANT: CPT

## 2020-05-16 PROCEDURE — 85025 COMPLETE CBC W/AUTO DIFF WBC: CPT

## 2020-05-16 PROCEDURE — 82550 ASSAY OF CK (CPK): CPT

## 2020-05-16 PROCEDURE — 99283 EMERGENCY DEPT VISIT LOW MDM: CPT

## 2020-05-16 PROCEDURE — 85007 BL SMEAR W/DIFF WBC COUNT: CPT

## 2020-05-16 PROCEDURE — 93005 ELECTROCARDIOGRAM TRACING: CPT

## 2020-05-16 PROCEDURE — 81001 URINALYSIS AUTO W/SCOPE: CPT

## 2020-05-16 PROCEDURE — 96361 HYDRATE IV INFUSION ADD-ON: CPT

## 2020-05-16 PROCEDURE — 80053 COMPREHEN METABOLIC PANEL: CPT

## 2020-05-16 PROCEDURE — 83735 ASSAY OF MAGNESIUM: CPT

## 2020-05-16 PROCEDURE — 83880 ASSAY OF NATRIURETIC PEPTIDE: CPT

## 2020-05-16 PROCEDURE — 96374 THER/PROPH/DIAG INJ IV PUSH: CPT

## 2020-05-16 PROCEDURE — 36415 COLL VENOUS BLD VENIPUNCTURE: CPT

## 2020-05-16 PROCEDURE — 71045 X-RAY EXAM CHEST 1 VIEW: CPT

## 2020-05-16 PROCEDURE — 99285 EMERGENCY DEPT VISIT HI MDM: CPT

## 2020-05-16 NOTE — RAD
AP chest.

 

HISTORY: Short of air

 

AP view was taken of the chest. Patient rotated to the right. There is 

arthritis in the shoulders especially on the right. The aorta is normal in

size. There is a pacemaker on the left with atrial and ventricular pacing 

leads without change. There is mild hazy atelectasis or infiltrate at the 

right costophrenic angle. No other infiltrates are noted.

 

IMPRESSION:

1. Mild hazy atelectasis or infiltrate at the right costophrenic angle.

2. No other acute infiltrates.

 

Electronically signed by: Frank Jacob MD (5/16/2020 12:36 PM) [id number]

## 2020-05-16 NOTE — PHYS DOC
Past Medical History


Past Medical History:  Anxiety, Depression, Hypertension


Additional Past Medical Histor:  COLON CA


Past Surgical History:  Cancer Surgery


Additional Past Surgical Histo:  INTESTIONAL MESH, COLOSTOMY, defib placed 2/20


Smoking Status:  Never Smoker


Alcohol Use:  None


Drug Use:  None





General Adult


EDM:


Chief Complaint:  ALTERED MENTAL STATUS





HPI:


HPI:





Patient is a 88  year old female who [facility] due to 

nursing staff refusing to accept patient, despite director of nursing having 

accepted patient back.  Patient's history and physical from previous stay has 

been reviewed.  Additional history is not available as patient is very poor 

historian and unable to answer questions.  []





Review of Systems:


Review of Systems:


Constitutional: No reported fever or chills. []


Respiratory: No reported cough or shortness of breath. [] 


Cardiovascular: No reported chest pain or edema. [] 


GI: No reported vomiting or diarrhea. [] 


Neurologic: Positive reported altered mental status. [] 





Unable to fully assess review of systems as patient is unable to answer 

questions.





Heart Score:


Risk Factors:


Risk Factors:  DM, Current or recent (<one month) smoker, HTN, HLP, family 

history of CAD, obesity.


Risk Scores:


Score 0 - 3:  2.5% MACE over next 6 weeks - Discharge Home


Score 4 - 6:  20.3% MACE over next 6 weeks - Admit for Clinical Observation


Score 7 - 10:  72.7% MACE over next 6 weeks - Early Invasive Strategies





Allergies:


Allergies:





Allergies








Coded Allergies Type Severity Reaction Last Updated Verified


 


  iodine Allergy Intermediate Itching 12/16/13 Yes











Physical Exam:


PE:





Constitutional: Well developed, well nourished, no acute distress, non-toxic 

appearance. []


HENT: Normocephalic, atraumatic, bilateral external ears normal, oropharynx 

moist, no oral exudates, nose normal. []


Eyes: PERRLA, EOMI, conjunctiva normal, no discharge. [] 


Neck: Normal range of motion, no tenderness, supple. [] 


Cardiovascular: Mildly tachycardic rate with regular rhythm []


Lungs & Thorax:  Bilateral breath sounds clear to auscultation []


Abdomen: Bowel sounds normal, soft, no tenderness. [] 


Skin: Warm, dry, no erythema, no rash. [] 


Extremities: No tenderness, no cyanosis, no clubbing, ROM intact, no edema. [] 


Neurologic: Awake and alert with tremor noted to left arm. []





EKG:


EKG:


[]





Radiology/Procedures:


Radiology/Procedures:


[]





Course & Med Decision Making:


Course & Med Decision Making


Pertinent Labs and Imaging studies reviewed. (See chart for details)





[]





Dragon Disclaimer:


Dragon Disclaimer:


This electronic medical record was generated, in whole or in part, using a voice

 recognition dictation system.





Departure


Departure


Impression:  


   Primary Impression:  


   AMS (altered mental status)


   Qualified Codes:  R41.82 - Altered mental status, unspecified


   Additional Impression:  


   Dystonia


Disposition:  01 HOME, SELF-CARE


Condition:  STABLE


Referrals:  


KRISTINE UGARTE MD (PCP)











SLAMA FORMAN Jr. DO          May 16, 2020 19:56

## 2020-05-16 NOTE — PDOC1
History and Physical


Date of Admission


Date of Admission


DATE: 5/16/20 


TIME: 16:03





Identification/Chief Complaint


Chief Complaint


Agitation





Source


Source:  Caregiver, Chart review, Patient





History of Present Illness


History of Present Illness


Ms Ceballos is an 87 yo F w/ PMHx Anxiety, Depression, Hypertension, colon ca s/p 

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 

was [facility] to [place] [facility] due to altered 

mental status while constantly yelling and shaking. She was confused throughout 

her hospital stay at [facility], seen by neurology and psychiatry, had negative MRI, 

was eventually placed on haldol with stabilization. Given her recently starting 

haldol she was given cogentin with improvement in ED. Of note nursing staff 

mentioned she was not in her left shoulder sling and both arms were restrained 

upon arrival. After cogentin was able to communicate she does not want to be 

tied down or locked up.





EKG WAS DONE AT 1042, HEART RATE OF 96 BPM, NO STEMI, PAC. CXR clear





She stopped  shaking,  became more awake alert, start talking with the nurses.  

Patient denies any headache, no chest pain, no cough or fever. Asked to leave 

the hospital.





Past Medical History


Cardiovascular:  HTN


Pulmonary:  No pertinent hx


CENTRAL NERVOUS SYSTEM:  Other


GI:  Constipation, GERD, Other


Heme/Onc:  Cancer


Hepatobiliary:  Cholelithiasis


Psych:  Anxiety


Musculoskeletal:  Osteoarthritis





Past Surgical History


Past Surgical History:  Colon Resection, Other





Social History


Smoke:  No


ALCOHOL:  none


Drugs:  None





Current Problem List


Problem List


Problems


Medical Problems:


(1) Anxiety


Status: Acute  





(2) Dehydration


Status: Acute  





(3) Dystonia


Status: Acute  











Current Medications


Current Medications





Current Medications


Benztropine Mesylate (Cogentin) 2 mg 1X  ONCE IV  Last administered on 5/16/20at

10:31;  Start 5/16/20 at 10:45;  Stop 5/16/20 at 10:46;  Status DC


Sodium Chloride 1,000 ml @  1,000 mls/hr 1X  ONCE IV  Last administered on 

5/16/20at 11:01;  Start 5/16/20 at 11:00;  Stop 5/16/20 at 11:59;  Status DC


Alprazolam (Xanax) 0.5 mg 1X  ONCE PO  Last administered on 5/16/20at 15:15;  

Start 5/16/20 at 15:15;  Stop 5/16/20 at 15:16;  Status DC





Active Scripts


Active


Benztropine Mesylate 1 Mg Tablet 1 Tab PO BID 3 Days


Reported


Norvasc (Amlodipine Besylate) 5 Mg Tablet 5 Mg PO DAILY


Metoprolol Tartrate 25 Mg Tablet 25 Mg PO BID


Lumigan (Bimatoprost) 2.5 Ml Drops 1 Drop EACHEYE QHS


Acetaminophen-Cod #3 Tablet (Acetaminophen/Codeine Phosphate) 1 Each Tablet 1 

Tab PO PRN QHS PRN


Clonidine Hcl 0.1 Mg Tablet 0.1 Mg PO PRN Q12HRS PRN


Losartan Potassium 50 Mg Tablet 50 Mg PO HS


Montelukast Sodium Tablet  ** (Montelukast Sodium) 10 Mg Tablet 10 Mg PO HS


Alprazolam 0.5 Mg Tablet 1-2 Tab PO BID


Aspirin 81 Mg Tab.chew 1 Tab PO DAILY


Acetaminophen-Cod #3 Tablet (Acetaminophen/Codeine Phosphate) 1 Each Tablet 1 

Tab PO DAILY


Cimetidine (Cimetidine Hcl) 300 Mg/5 Ml Solution 30 Mg PO DAILY


Zoloft (Sertraline Hcl) 50 Mg Tablet 1 Tab PO BID





Allergies


Allergies:  


Coded Allergies:  


     iodine (Verified  Allergy, Intermediate, Itching, 12/16/13)





ROS


General:  No: Chills, Night Sweats, Fatigue, Malaise, Appetite, Other


PSYCHOLOGICAL ROS:  YES: Depression; 


   No: Anxiety, Behavioral Disorder, Concentration difficultie, Decreased 

libido, Disorientation, Hallucinations, Hostility, Irritablity, Memory 

difficulties, Mood Swings, Obsessive thoughts, Physical abuse, Sexual abuse, 

Sleep disturbances, Suicidal ideation, Other


Eyes:  No Blurry vision, No Decreased vision, No Double vision, No Dry eyes, No 

Excessive tearing, No Eye Pain, No Itchy Eyes, No Loss of vision, No 

Photophobia, No Scotomata, No Uses contacts, No Uses glasses, No Other


HEENT:  No: Heacaches, Visual Changes, Hearing change, Nasal congestion, Nasal 

discharge, Oral lesions, Sinus pain, Sore Throat, Epistaxis, Sneezing, Snoring, 

Tinnitus, Vertigo, Vocal changes, Other


ALLERGY AND IMMUNOLOGY:  No: Hives, Insect Bite Sensitivity, Itchy/Watery Eyes, 

Nasal Congestion, Post Nasal Drip, Seasonal Allergies, Other


Hematological and Lymphatic:  No: Bleeding Problems, Blood Clots, Blood 

Transfusions, Brusing, Night Sweats, Pallor, Swollen Lymph Nodes, Other


ENDOCRINE:  No: Breast Changes, Galactorrhea, Hair Pattern Changes, Hot Flashes,

Malaise/lethargy, Mood Swings, Palpitations, Polydipsia/polyuria, Skin Changes, 

Temperature Intolerance, Unexpected Weight Changes, Other


Breast:  No New/Changing Breast Lumps, No Nipple changes, No Nipple discharge, 

No Other


Respiratory:  No: Cough, Hemoptysis, Orthopnea, Pleuritic Pain, Shortness of 

breath, SOB with excertion, Sputum Changes, Stridor, Tachypnea, Wheezing, Other


Cardiovascular:  No Chest Pain, No Palpitations, No Orthopnea, No Paroxysmal 

Noc. Dyspnea, No Edema, No Lt Headedness, No Other


Gastrointestinal:  No Nausea, No Vomiting, No Abdominal Pain, No Diarrhea, No 

Constipation, No Melena, No Hematochezia, No Other


Genitourinary:  No Dysuria, No Frequency, No Incontinence, No Hematuria, No 

Retention, No Discharge, No Urgency, No Pain, No Flank Pain, No Other, No , No ,

No , No , No , No , No 


Musculoskeletal:  No Gait Disturbance, No Joint Pain, No Joint Stiffness, No 

Joint Swelling, No Muscle Pain, No Muscular Weakness, No Pain In:, No Swelling 

In:, No Other


Neurological:  No Behavorial Changes, No Bowel/Bladder ControlChng, No 

Confusion, No Dizziness, No Gait Disturbance, No Headaches, No Impaired 

Coord/balance, No Memory Loss, No Numbness/Tingling, No Seizures, No Speech 

Problems, No Tremors, No Visual Changes, No Weakness, No Other


Skin:  No Dry Skin, No Eczema, No Hair Changes, No Lumps, No Mole Changes, No 

Mottling, No Nail Changes, No Pruritus, No Rash, No Skin Lesion Changes, No 

Other, No Acne





Physical Exam


General:  Alert, Cooperative, No acute distress


HEENT:  Atraumatic, PERRLA, EOMI, Mucous membr. moist/pink


Lungs:  Clear to auscultation, Normal air movement


Heart:  S1S2, RRR, no thrills, no rubs, other (left  chest with PPM site 

bandaged)


Abdomen:  Normal bowel sounds, Soft, No tenderness, No hepatosplenomegaly, No 

masses


Rectal Exam:  not examined


Extremities:  No clubbing, No cyanosis, No edema, Normal pulses, No 

tenderness/swelling


Skin:  No rashes, No breakdown, No significant lesion


Neuro:  Normal speech, Strength at 5/5 X4 ext, Normal tone, Sensation intact, 

Cranial nerves 3-12 NL, Reflexes 2+, Other (Gait not tested)





Vitals


Vitals





Vital Signs








  Date Time  Temp Pulse Resp B/P (MAP) Pulse Ox O2 Delivery O2 Flow Rate FiO2


 


5/16/20 15:07  108 20 208/107 (140) 100 Room Air  


 


5/16/20 10:05 97.5       





 97.5       











Labs


Labs





Laboratory Tests








Test


 5/16/20


10:20 5/16/20


12:39


 


White Blood Count


 10.9 x10^3/uL


(4.0-11.0) 





 


Red Blood Count


 4.31 x10^6/uL


(3.50-5.40) 





 


Hemoglobin


 13.1 g/dL


(12.0-15.5) 





 


Hematocrit


 39.8 %


(36.0-47.0) 





 


Mean Corpuscular Volume 92 fL ()  


 


Mean Corpuscular Hemoglobin 30 pg (25-35)  


 


Mean Corpuscular Hemoglobin


Concent 33 g/dL


(31-37) 





 


Red Cell Distribution Width


 14.4 %


(11.5-14.5) 





 


Platelet Count


 161 x10^3/uL


(140-400) 





 


Neutrophils (%) (Auto) 90 % (31-73)  


 


Lymphocytes (%) (Auto) 4 % (24-48)  


 


Monocytes (%) (Auto) 6 % (0-9)  


 


Eosinophils (%) (Auto) 0 % (0-3)  


 


Basophils (%) (Auto) 0 % (0-3)  


 


Neutrophils # (Auto)


 9.8 x10^3/uL


(1.8-7.7) 





 


Lymphocytes # (Auto)


 0.4 x10^3/uL


(1.0-4.8) 





 


Monocytes # (Auto)


 0.6 x10^3/uL


(0.0-1.1) 





 


Eosinophils # (Auto)


 0.0 x10^3/uL


(0.0-0.7) 





 


Basophils # (Auto)


 0.0 x10^3/uL


(0.0-0.2) 





 


Segmented Neutrophils % 94 % (35-66)  


 


Band Neutrophils % 1 % (0-9)  


 


Lymphocytes % 4 % (24-48)  


 


Monocytes % 1 % (0-10)  


 


Platelet Estimate


 Adequate


(ADEQUATE) 





 


Sodium Level


 146 mmol/L


(136-145) 





 


Potassium Level


 3.1 mmol/L


(3.5-5.1) 





 


Chloride Level


 111 mmol/L


() 





 


Carbon Dioxide Level


 23 mmol/L


(21-32) 





 


Anion Gap 12 (6-14)  


 


Blood Urea Nitrogen


 22 mg/dL


(7-20) 





 


Creatinine


 1.0 mg/dL


(0.6-1.0) 





 


Estimated GFR


(Cockcroft-Gault) 63.3 


 





 


BUN/Creatinine Ratio 22 (6-20)  


 


Glucose Level


 104 mg/dL


(70-99) 





 


Calcium Level


 8.5 mg/dL


(8.5-10.1) 





 


Magnesium Level


 1.8 mg/dL


(1.8-2.4) 





 


Total Bilirubin


 0.3 mg/dL


(0.2-1.0) 





 


Aspartate Amino Transf


(AST/SGOT) 31 U/L (15-37) 


 





 


Alanine Aminotransferase


(ALT/SGPT) 30 U/L (14-59) 


 





 


Alkaline Phosphatase


 82 U/L


() 





 


Creatine Kinase


 105 U/L


() 





 


Troponin I Quantitative


 0.174 ng/mL


(0.000-0.055) 





 


NT-Pro-B-Type Natriuretic


Peptide 1959 pg/mL


(0-449) 





 


Total Protein


 7.1 g/dL


(6.4-8.2) 





 


Albumin


 2.8 g/dL


(3.4-5.0) 





 


Albumin/Globulin Ratio 0.7 (1.0-1.7)  


 


Urine Collection Type  Unknown 


 


Urine Color  Yellow 


 


Urine Clarity  Clear 


 


Urine pH  6.0 (<5.0-8.0) 


 


Urine Specific Gravity


 


 1.025


(1.000-1.030)


 


Urine Protein


 


 30 mg/dL


(NEG-TRACE)


 


Urine Glucose (UA)


 


 Negative mg/dL


(NEG)


 


Urine Ketones (Stick)


 


 Negative mg/dL


(NEG)


 


Urine Blood  Trace (NEG) 


 


Urine Nitrite  Negative (NEG) 


 


Urine Bilirubin  Negative (NEG) 


 


Urine Urobilinogen Dipstick


 


 0.2 mg/dL (0.2


mg/dL)


 


Urine Leukocyte Esterase  Negative (NEG) 


 


Urine RBC  Occ /HPF (0-2) 


 


Urine WBC  0 /HPF (0-4) 


 


Urine Squamous Epithelial


Cells 


 Occ /LPF 





 


Urine Bacteria  0 /HPF (0-FEW) 


 


Urine Hyaline Casts  Few /HPF 


 


Urine Mucus  Slight /LPF 








Laboratory Tests








Test


 5/16/20


10:20 5/16/20


12:39


 


White Blood Count


 10.9 x10^3/uL


(4.0-11.0) 





 


Red Blood Count


 4.31 x10^6/uL


(3.50-5.40) 





 


Hemoglobin


 13.1 g/dL


(12.0-15.5) 





 


Hematocrit


 39.8 %


(36.0-47.0) 





 


Mean Corpuscular Volume 92 fL ()  


 


Mean Corpuscular Hemoglobin 30 pg (25-35)  


 


Mean Corpuscular Hemoglobin


Concent 33 g/dL


(31-37) 





 


Red Cell Distribution Width


 14.4 %


(11.5-14.5) 





 


Platelet Count


 161 x10^3/uL


(140-400) 





 


Neutrophils (%) (Auto) 90 % (31-73)  


 


Lymphocytes (%) (Auto) 4 % (24-48)  


 


Monocytes (%) (Auto) 6 % (0-9)  


 


Eosinophils (%) (Auto) 0 % (0-3)  


 


Basophils (%) (Auto) 0 % (0-3)  


 


Neutrophils # (Auto)


 9.8 x10^3/uL


(1.8-7.7) 





 


Lymphocytes # (Auto)


 0.4 x10^3/uL


(1.0-4.8) 





 


Monocytes # (Auto)


 0.6 x10^3/uL


(0.0-1.1) 





 


Eosinophils # (Auto)


 0.0 x10^3/uL


(0.0-0.7) 





 


Basophils # (Auto)


 0.0 x10^3/uL


(0.0-0.2) 





 


Segmented Neutrophils % 94 % (35-66)  


 


Band Neutrophils % 1 % (0-9)  


 


Lymphocytes % 4 % (24-48)  


 


Monocytes % 1 % (0-10)  


 


Platelet Estimate


 Adequate


(ADEQUATE) 





 


Sodium Level


 146 mmol/L


(136-145) 





 


Potassium Level


 3.1 mmol/L


(3.5-5.1) 





 


Chloride Level


 111 mmol/L


() 





 


Carbon Dioxide Level


 23 mmol/L


(21-32) 





 


Anion Gap 12 (6-14)  


 


Blood Urea Nitrogen


 22 mg/dL


(7-20) 





 


Creatinine


 1.0 mg/dL


(0.6-1.0) 





 


Estimated GFR


(Cockcroft-Gault) 63.3 


 





 


BUN/Creatinine Ratio 22 (6-20)  


 


Glucose Level


 104 mg/dL


(70-99) 





 


Calcium Level


 8.5 mg/dL


(8.5-10.1) 





 


Magnesium Level


 1.8 mg/dL


(1.8-2.4) 





 


Total Bilirubin


 0.3 mg/dL


(0.2-1.0) 





 


Aspartate Amino Transf


(AST/SGOT) 31 U/L (15-37) 


 





 


Alanine Aminotransferase


(ALT/SGPT) 30 U/L (14-59) 


 





 


Alkaline Phosphatase


 82 U/L


() 





 


Creatine Kinase


 105 U/L


() 





 


Troponin I Quantitative


 0.174 ng/mL


(0.000-0.055) 





 


NT-Pro-B-Type Natriuretic


Peptide 1959 pg/mL


(0-449) 





 


Total Protein


 7.1 g/dL


(6.4-8.2) 





 


Albumin


 2.8 g/dL


(3.4-5.0) 





 


Albumin/Globulin Ratio 0.7 (1.0-1.7)  


 


Urine Collection Type  Unknown 


 


Urine Color  Yellow 


 


Urine Clarity  Clear 


 


Urine pH  6.0 (<5.0-8.0) 


 


Urine Specific Gravity


 


 1.025


(1.000-1.030)


 


Urine Protein


 


 30 mg/dL


(NEG-TRACE)


 


Urine Glucose (UA)


 


 Negative mg/dL


(NEG)


 


Urine Ketones (Stick)


 


 Negative mg/dL


(NEG)


 


Urine Blood  Trace (NEG) 


 


Urine Nitrite  Negative (NEG) 


 


Urine Bilirubin  Negative (NEG) 


 


Urine Urobilinogen Dipstick


 


 0.2 mg/dL (0.2


mg/dL)


 


Urine Leukocyte Esterase  Negative (NEG) 


 


Urine RBC  Occ /HPF (0-2) 


 


Urine WBC  0 /HPF (0-4) 


 


Urine Squamous Epithelial


Cells 


 Occ /LPF 





 


Urine Bacteria  0 /HPF (0-FEW) 


 


Urine Hyaline Casts  Few /HPF 


 


Urine Mucus  Slight /LPF 











VTE Prophylaxis Ordered


VTE Prophylaxis Devices:  Yes


VTE Pharmacological Prophylaxi:  No





Assessment/Plan


Assessment/Plan


A/P:


Metabolic encephalopathy - resolved with hydration and cogentin


SSS: with 15 sec pause. S/P PPM. Presently pacing tachy at 100-110. Sling in 

place, no immediate complications. 


Dehydration - treated


HTN - controlled


Hx of colon CA with colostomy placement


Likely underlying MCI or dementia noted on prior admission - light during day, 

cont haldol prn, add cogentin 1-2mg PRN BID





Dispo - Her condition appears to be a dystonic reaction or an anxiety attack or 

withdrawing from benzo medication.  Patient was found to be severely dehydrated,

patient was given 2 mg of Cogentin IV and 1 L normal saline IV. Ok to return to 

SNF, no indication for admission. Advised to wearing left arm sling at SNF











ASA CUNNINGHAM MD        May 16, 2020 16:03

## 2020-05-16 NOTE — PHYS DOC
Past Medical History


Past Medical History:  Anxiety, Depression, Hypertension


Additional Past Medical Histor:  COLON CA


Past Surgical History:  Cancer Surgery


Additional Past Surgical Histo:  INTESTIONAL MESH, COLOSTOMY, defib placed 


Smoking Status:  Never Smoker


Alcohol Use:  None


Drug Use:  None





General Adult


EDM:


Chief Complaint:  ALTERED MENTAL STATUS





HPI:


HPI:





Patient is a 88  year old female who [facility] [place] due to altered mental status while constantly yelling and shaking. 

 Patient was just discharged from this hospital after she was admitted for 

pacemaker placement.  She was sent there last night.  Patient is supposed to be 

on blood pressure mediation and xanax.  Not sure if she was given her morning 

medication.  It was reported that when she was received at the nursing home, she

was already moaning and shaking.  She did not stop moaning and skaking this 

morning so they called EMS to send her back here.





Review of Systems:


Review of Systems:


not able to obtain due to patient's condition.





Heart Score:


Risk Factors:


Risk Factors:  DM, Current or recent (<one month) smoker, HTN, HLP, family 

history of CAD, obesity.


Risk Scores:


Score 0 - 3:  2.5% MACE over next 6 weeks - Discharge Home


Score 4 - 6:  20.3% MACE over next 6 weeks - Admit for Clinical Observation


Score 7 - 10:  72.7% MACE over next 6 weeks - Early Invasive Strategies





Current Medications:





Current Medications








 Medications


  (Trade)  Dose


 Ordered  Sig/Cecilio  Start Time


 Stop Time Status Last Admin


Dose Admin


 


 Benztropine


 Mesylate


  (Cogentin)  2 mg  1X  ONCE  20 10:45


 20 10:46 DC 20 10:31


2 MG


 


 Sodium Chloride  1,000 ml @ 


 1,000 mls/hr  1X  ONCE  20 11:00


 20 11:59 DC 20 11:01


1,000 MLS/HR











Allergies:


Allergies:





Allergies








Coded Allergies Type Severity Reaction Last Updated Verified


 


  iodine Allergy Intermediate Itching 13 Yes











Physical Exam:


PE:





Constitutional: Well developed, well nourished,  mild acute distress, non-toxic 

appearance. []


HENT: Normocephalic, atraumatic, bilateral external ears normal, oropharynx is 

very dried, tongue is very dried, nose normal. []


Eyes: PERRLA, EOMI, conjunctiva normal, no discharge. [] 


Neck: Normal range of motion, no tenderness, supple, no stridor. [] 


Cardiovascular: sinus tachycardia, regular rhythm, no murmur []


Lungs & Thorax:  Bilateral breath sounds clear to auscultation []


Abdomen: Bowel sounds normal, soft, no tenderness, no masses, no pulsatile 

masses. [] 


Skin: Warm, dry, no erythema, no rash. [] 


Back: No tenderness, no CVA tenderness. [] 


Extremities: No tenderness, no cyanosis, no clubbing, ROM intact, no edema. [] 


Neurologic: Alert, constant rolling her lips, shaking her her legs and her 

hands.  ]


Psychologic: anxious, constantly chanting....





Current Patient Data:


Labs:





                                Laboratory Tests








Test


 20


10:20 20


12:39


 


White Blood Count


 10.9 x10^3/uL


(4.0-11.0) 





 


Red Blood Count


 4.31 x10^6/uL


(3.50-5.40) 





 


Hemoglobin


 13.1 g/dL


(12.0-15.5) 





 


Hematocrit


 39.8 %


(36.0-47.0) 





 


Mean Corpuscular Volume


 92 fL ()


 





 


Mean Corpuscular Hemoglobin 30 pg (25-35)   


 


Mean Corpuscular Hemoglobin


Concent 33 g/dL


(31-37) 





 


Red Cell Distribution Width


 14.4 %


(11.5-14.5) 





 


Platelet Count


 161 x10^3/uL


(140-400) 





 


Neutrophils (%) (Auto) 90 % (31-73)  H 


 


Lymphocytes (%) (Auto) 4 % (24-48)  L 


 


Monocytes (%) (Auto) 6 % (0-9)   


 


Eosinophils (%) (Auto) 0 % (0-3)   


 


Basophils (%) (Auto) 0 % (0-3)   


 


Neutrophils # (Auto)


 9.8 x10^3/uL


(1.8-7.7)  H 





 


Lymphocytes # (Auto)


 0.4 x10^3/uL


(1.0-4.8)  L 





 


Monocytes # (Auto)


 0.6 x10^3/uL


(0.0-1.1) 





 


Eosinophils # (Auto)


 0.0 x10^3/uL


(0.0-0.7) 





 


Basophils # (Auto)


 0.0 x10^3/uL


(0.0-0.2) 





 


Segmented Neutrophils % 94 % (35-66)  H 


 


Band Neutrophils % 1 % (0-9)   


 


Lymphocytes % 4 % (24-48)  L 


 


Monocytes % 1 % (0-10)   


 


Platelet Estimate


 Adequate


(ADEQUATE) 





 


Sodium Level


 146 mmol/L


(136-145)  H 





 


Potassium Level


 3.1 mmol/L


(3.5-5.1)  L 





 


Chloride Level


 111 mmol/L


()  H 





 


Carbon Dioxide Level


 23 mmol/L


(21-32) 





 


Anion Gap 12 (6-14)   


 


Blood Urea Nitrogen


 22 mg/dL


(7-20)  H 





 


Creatinine


 1.0 mg/dL


(0.6-1.0) 





 


Estimated GFR


(Cockcroft-Gault) 63.3  


 





 


BUN/Creatinine Ratio 22 (6-20)  H 


 


Glucose Level


 104 mg/dL


(70-99)  H 





 


Calcium Level


 8.5 mg/dL


(8.5-10.1) 





 


Magnesium Level


 1.8 mg/dL


(1.8-2.4) 





 


Total Bilirubin


 0.3 mg/dL


(0.2-1.0) 





 


Aspartate Amino Transferase


(AST) 31 U/L (15-37)


 





 


Alanine Aminotransferase (ALT)


 30 U/L (14-59)


 





 


Alkaline Phosphatase


 82 U/L


() 





 


Creatine Kinase


 105 U/L


() 





 


Troponin I Quantitative


 0.174 ng/mL


(0.000-0.055) 





 


NT-Pro-B-Type Natriuretic


Peptide 1959 pg/mL


(0-449)  H 





 


Total Protein


 7.1 g/dL


(6.4-8.2) 





 


Albumin


 2.8 g/dL


(3.4-5.0)  L 





 


Albumin/Globulin Ratio


 0.7 (1.0-1.7)


L 





 


Urine Collection Type  Unknown  


 


Urine Color  Yellow  


 


Urine Clarity  Clear  


 


Urine pH


 


 6.0 (<5.0-8.0)





 


Urine Specific Gravity


 


 1.025


(1.000-1.030)


 


Urine Protein


 


 30 mg/dL


(NEG-TRACE)


 


Urine Glucose (UA)


 


 Negative mg/dL


(NEG)


 


Urine Ketones (Stick)


 


 Negative mg/dL


(NEG)


 


Urine Blood  Trace (NEG)  


 


Urine Nitrite


 


 Negative (NEG)





 


Urine Bilirubin


 


 Negative (NEG)





 


Urine Urobilinogen Dipstick


 


 0.2 mg/dL (0.2


mg/dL)


 


Urine Leukocyte Esterase


 


 Negative (NEG)





 


Urine RBC


 


 Occ /HPF (0-2)





 


Urine WBC  0 /HPF (0-4)  


 


Urine Squamous Epithelial


Cells 


 Occ /LPF  





 


Urine Bacteria


 


 0 /HPF (0-FEW)





 


Urine Hyaline Casts  Few /HPF  


 


Urine Mucus  Slight /LPF  





                                Laboratory Tests


20 10:20








                                Laboratory Tests


20 10:20








Vital Signs:





                                   Vital Signs








  Date Time  Temp Pulse Resp B/P (MAP) Pulse Ox O2 Delivery O2 Flow Rate FiO2


 


20 14:07  96 21 141/89 (106) 99 Room Air  


 


20 10:05 97.5       





 97.5       











EKG:


EKG:


EKG WAS DONE AT 1042, HEART RATE OF 96 BPM, NO STEMI, PAC.





Radiology/Procedures:


Radiology/Procedures:


[][facility]


                    [address] [place] [address]


                                 [phone]


                                        


                                 IMAGING REPORT





                                     Signed





PATIENT: BETTYE HUBER    ACCOUNT: [id number]     MRN#: [id number]


: 1932           LOCATION: ER              AGE: 88


SEX: F                    EXAM DT: 20         ACCESSION#: [id number]


STATUS: REG ER            ORD. PHYSICIAN: MIGDALIA RATLIFF DO


REASON: SOA


PROCEDURE: CHEST AP ONLY





AP chest.


 


HISTORY: Short of air


 


AP view was taken of the chest. Patient rotated to the right. There is 


arthritis in the shoulders especially on the right. The aorta is normal in


size. There is a pacemaker on the left with atrial and ventricular pacing 


leads without change. There is mild hazy atelectasis or infiltrate at the 


right costophrenic angle. No other infiltrates are noted.


 


IMPRESSION:


1. Mild hazy atelectasis or infiltrate at the right costophrenic angle.


2. No other acute infiltrates.


 


Electronically signed by: Frank Jacob MD (2020 12:36 PM) [id number]














DICTATED and SIGNED BY:     FRANK JACOB MD


DATE:     20 1236





Course & Med Decision Making:


Course & Med Decision Making


Pertinent Labs and Imaging studies reviewed. (See chart for details)





Patient is an 88-year-old female who was brought here from nursing home due to a

bnormal behavior.  Her condition appears to be a dystonic reaction or an anxiety

 attack or withdrawing from benzo medication.  Patient was found to be severely 

dehydrated, patient was given 2 mg of Cogentin IV and 1 L normal saline IV.  She

 stopped  shaking,  became more awake alert, start talking with the nurses.  

Patient denies any headache, no chest pain, no cough or fever.





Discussed with Dr. Carter, hospitalist who are familiar with her, recommended to

 send her back to the nursing home.





Dragon Disclaimer:


Dragon Disclaimer:


This electronic medical record was generated, in whole or in part, using a voice

 recognition dictation system.





Departure


Departure


Impression:  


   Primary Impression:  


   Dystonia


   Additional Impressions:  


   Dehydration


   Anxiety


Disposition:   HOME, SELF-CARE


Condition:  IMPROVED


Referrals:  


KRISTINE UGARTE MD (PCP)


Patient Instructions:  Anxiety and Panic Attacks, Easy-to-Read, Dehydration, 

Adult, Dystonic Reaction


Scripts


Benztropine Mesylate (BENZTROPINE MESYLATE) 1 Mg Tablet


1 TAB PO BID for DYSTONIC REACTION for 3 Days, #6 TAB


   Prov: MIGDALIA RATLIFF DO         20











MIGDALIA RATLIFF DO                May 16, 2020 15:00

## 2020-05-18 NOTE — EKG
[facility]

              [address] [place] [address]

Test Date:    2020               Test Time:    10:42:39

Pat Name:     BETTYE HUBER              Department:   

Patient ID:   [id number]           Room:          

Gender:       F                        Technician:   

:          [id number]               Requested By: MIGDALIA RATLIFF

Order Number: [id number]           Reading MD:   Hugh Bentley MD

                                 Measurements

Intervals                              Axis          

Rate:         96                       P:            -17

PA:           162                      QRS:          -81

QRSD:         132                      T:            89

QT:           394                                    

QTc:          505                                    

                           Interpretive Statements

SINUS RHYTHM

ATRIAL PREMATURE COMPLEX(ES)

ABNORMAL LEFT AXIS DEVIATION

NON SPECIFIC INTRAVENTRICULAR BLOCK

QRS(T) CONTOUR ABNORMALITY

CONSISTENT WITH ANTERIOR INFARCT

PROBABLY OLD

CONSISTENT WITH INFEROLATERAL INFARCT

POSSIBLY RECENT

ABNORMAL ECG



Electronically Signed On 2020 9:21:16 CDT by Hugh Bentley MD